# Patient Record
Sex: FEMALE | Race: WHITE | NOT HISPANIC OR LATINO | URBAN - METROPOLITAN AREA
[De-identification: names, ages, dates, MRNs, and addresses within clinical notes are randomized per-mention and may not be internally consistent; named-entity substitution may affect disease eponyms.]

---

## 2021-03-01 ENCOUNTER — TELEPHONE ENCOUNTER (OUTPATIENT)
Dept: URBAN - METROPOLITAN AREA CLINIC 98 | Facility: CLINIC | Age: 26
End: 2021-03-01

## 2021-03-01 ENCOUNTER — OFFICE VISIT (OUTPATIENT)
Dept: URBAN - METROPOLITAN AREA CLINIC 98 | Facility: CLINIC | Age: 26
End: 2021-03-01
Payer: COMMERCIAL

## 2021-03-01 DIAGNOSIS — K58.0 IRRITABLE BOWEL SYNDROME WITH DIARRHEA: ICD-10-CM

## 2021-03-01 DIAGNOSIS — R10.84 ABDOMINAL PAIN, GENERALIZED: ICD-10-CM

## 2021-03-01 PROCEDURE — 99215 OFFICE O/P EST HI 40 MIN: CPT | Performed by: INTERNAL MEDICINE

## 2021-03-01 RX ORDER — DICYCLOMINE HYDROCHLORIDE 10 MG/1
TAKE 1-2 CAPSULES BY ORAL ROUTE 3 TIMES A DAY  PRN CAPSULE ORAL
Qty: 0 | Refills: 0 | Status: ACTIVE | COMMUNITY
Start: 2019-01-07

## 2021-03-01 RX ORDER — HYOSCYAMINE SULFATE 0.12 MG/1
1 TABLET UNDER THE TONGUE AND ALLOW TO DISSOLVE  AS NEEDED TABLET, ORALLY DISINTEGRATING ORAL
Qty: 120 TABLETS | Refills: 11 | OUTPATIENT
Start: 2021-03-01 | End: 2022-02-24

## 2021-03-01 RX ORDER — NORETHINDRONE ACETATE AND ETHINYL ESTRADIOL AND FERROUS FUMARATE 1MG-20(24)
KIT ORAL
Qty: 0 | Refills: 0 | Status: ACTIVE | COMMUNITY
Start: 1900-01-01

## 2021-03-01 RX ORDER — DIPHENOXYLATE HCL/ATROPINE 2.5-.025MG
TAKE 2 TABLETS (5 MG) BY ORAL ROUTE ONCE DAILY AT BEDTIME TABLET ORAL 1
Qty: 30 | Refills: 0 | Status: ACTIVE | COMMUNITY
Start: 2019-04-04

## 2021-03-01 RX ORDER — CHROMIUM 200 MCG
TABLET ORAL
Qty: 0 | Refills: 0 | Status: ACTIVE | COMMUNITY
Start: 1900-01-01

## 2021-03-01 NOTE — HPI-OTHER HISTORIES
26 yo female with FH CD in mother -ileitis at age 40, and now comes for 2 year f/u.  Exttensive w/u over [phone] by Dr. Krishnamurthy.  Syx are the same but the syx are affecting her more.  Life revolves around 'pooping' ---- urgency. Difficult time holding it. Worse in the am. Works in clients homes. Sharp pains from umbilicus to rectum. Not daily but it occurs few times a week. No bleeding. 3-7 stools a day with urgency  has tried fasting, e.g. Friday, fasted the whole time. 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 Best way to have 1-3 BM ---- she is gluten free. That did get rid of eczema. 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  Following a gluten free diet for 6-7years.  She emptied herself and went  Ate mint and might have triggered syx. Does not tolerate Boost or Ensure.  Never tried levsin    ========================= 5/24/19  This is a scheduled follow-up appointment for this patient, a     24 yo female with fh of CD in mother MAGI who is present and got dx with CD at age 40ish, and had CD of SB.  5 level consultation and second opinion  Sarahi has had syx of diarrhea for 5-10 years and has had a careful eval by Dr. Krishnamurthy with no dx made to date.  Left side colon exam and EGD on 9/8/14 was wnl - no colon bx. Pill cam negative per report and was 2015-6.----Review of pill cam report dated 10/28/15 shows delayed gastric and duodenal emptying and 2 questionable smll ulcers vs food in small inteine at 5 hour 3min into study.  Nl cbc cmp, esr and crp in past.  Positive ASCA IgG of 29.1 with nl < 24.9 on 12/13/17.  First BM may be formed and soft and goes 4 more times a day, in range of 2-7 today. No blood. Lower abd pain and urgency. Has gone 4 times a day.  EIM - negative.  GYN noted recurrent yeast.  Dr. Krishnamurthy found SB overgrowth from Feb 2019 and tx with xifaxan and it did not help.  Viberzi did not help - worsened.         Past Medical History  Disease Name Date Onset Notes  Abdominal pain unk 10/23/2015 -   Abdominal pain, generalized 05/24/2019 --   Anxiety unk 10/23/2015 -   Depression unk 10/23/2015 -   Diarrhea --  --   IBS (irritable bowel syndrome) --  --   OTHER --  Gardasil  Ultrasound 2014 10/23/2015 -   Urgency incontinence --  --       Past Surgical History  Procedure Name Date Notes  Colonoscopy 9/08/14   EGD 9/8/14       Medication List  Name Date Started Instructions  dicyclomine 10 mg oral capsule 01/07/2019 take 1-2 capsules by oral route 3 times a day prn  ferrasorb  --   Lomotil 2.5-0.025 mg oral tablet 04/04/2019 take 2 tablets (5 mg) by oral route once daily at bedtime  Minastrin 24 Fe 1 mg-20 mcg(24) /75 mg (4) oral tablet,chewable  --   Prenatal oral  --   theralac  --   true max  --   Vitamin D3 1,000 unit oral capsule  --   zinc 50 mg oral tablet  --       Allergy List  Allergen Name Date Reaction Notes  No Known Environmental Allergies --  --  10/23/2015 -   No Known Food Allergies --  --  10/23/2015 -   Iodine --  --  --       Family Medical History  Disease Name Relative/Age Notes  *No Stated Family Medical History Father/ Mother/  unk      Social History  Finding Status Start/Stop Quantity Notes  Alcohol Former --/-- --  05/24/2019 - 04/04/2019 - 01/16/2019 - 12/12/2018 - 12/13/2017 - 10/23/2015 -   Denies substance abuse Never --/-- --  05/24/2019 -   Tobacco Never --/-- --  12/13/2017 - 10/23/2015 -       Review of Systems  ConstitutionalDenies : body aches, chills, fatigue, fever, loss of appetite, malaise, night sweats, weight gain, weight loss  EyesDenies : blurred vision, visual changes  HENTDenies : Ear Pain/Ringing, hearing loss, Mouth Ulcers/Sores, nose bleeds, problems with gums/teeth, trouble swallowing  CardiovascularDenies : chest pain, leaky heart valves, heart murmur, heart racing/skipping, high blood pressure, palpitations  RespiratoryDenies : chronic cough, shortness of breath, wheezing or asthma symptoms  GastrointestinalDenies : Abdominal Pain/discomfort, Anal/Rectal Pain or Itching, Anal Spasm, Black Stool, Bloating/belching/gaseouness, Change of Bowel Habit, Constipation, Diarrhea/Loose Stool, Difficulty in Swallowing, Heartburn/esophageal reflux, Hemorrhoids, Indigestion, Mucus in Stool, Nausea/vomiting, Rectal Bleeding, Unintentional Weight Loss  GenitourinaryDenies : Blood in Urine, Burning/pain with Urination, frequency, Recent/frequent UTI, Kidney Stones  IntegumentDenies : itching/dry skin, jaundice, rashes, bumps or sores  NeurologicDenies : headaches, dizziness/vertigo, head trauma/injury, recent numbness/weakness, seizures  MusculoskeletalDenies : back pain, decreased range of motion, joint pain/arthritis, Problems Walking/calf or leg pain  EndocrineDenies : bruise easily, excessive thirst, heat/cold intolerance, history of high or low blood sugar  PsychiatricDenies : anxiety, changes in sleep pattern, depression, loss of memory  Heme-LymphDenies : Bleeding Problems, Enlarged Nodes/Swolen Glands, excessive bruising, history of anemia  Allergic-ImmunologicDenies : seasonal allergies    Vitals  Date Time BP Position Site L\R Cuff Size HR RR TEMP (F) WT  HT  BMI kg/m2 BSA m2 O2 Sat      05/24/2019 09:40 /71 Sitting    65 - R  97.8 129lbs 0oz 5'  4" 22.14 1.63        Physical Examination  ConstitutionalAppearance : Well nourished, well developed patient in no distress. Ambulating without difficulty.  Ability to Communicate : Normal communication ability  EyesConjunctivae and Eyelids : Conjunctivae and eyelids appear normal  Sclerae : White without injection  HENTHead :  Inspection : Normocephalic and atraumatic  Face :  Inspection : Face within normal limits  Ears :  External Ears : External ears within normal limits  Nose/Nasopharynx :  External Nose : External nose normal appearance, nares patent, no nasal discharge  Mouth and Throat :  General : Oral cavity appearance normal, breath odor normal  Lips : Appearance normal  NeckInspection and Palpation : Normal appearance without tenderness on palpation or deformities, trachea midline  Thyroid : Normal size without tenderness, nodules or masses  Jugular Veins : no JVD  ChestInspection of Chest : No lesions, deformities or traumatic injuries present. No significant scars are present.  Respiratory Effort : Breathing is unlabored without accessory muscle use  Palpation of Chest : Chest wall non-tender with no masses present. Tactile fremitus is normal  Auscultation : Normal breath sounds  CardiovascularHeart :  Auscultation : Heart rate is regular with normal rhythm. No murmurs are heard. No edema.  GastrointestinalAbdominal Exam : Abdomen soft without rigidity or guarding, abdomen non-tender to palpation, normal bowel sounds, no masses present, non-distended  Liver and Spleen : No hepatomegaly present. Liver is non-tender to palpation and spleen is not palpable.  LymphaticNeck : No lymphadenopathy present  Axillae : No lymphadenopathy present  Groin : No lymphadenopathy present  MusculoskeletalGait and Station : Normal Gait, normal station  Neck/Spine/Pelvis : No tenderness of deformities present.  SkinGeneral Inspection : No rashes, lesions or areas of discoloration present. Skin turgor is normal.  General Palpation : No abnormalities, masses or tenderness on palpation.  Neurologic and PsychiatricOrientation : Oriented to person, place and time  Sensation : Normal sensation  Memory : Short and long term memory intact.  Mood and Affect : Normal mood with an appropriate affect      Assessment  Abdominal pain, generalized     789.07/R10.84  Diarrhea     787.91/R19.7  Urgency incontinence     788.31/N39.41    Plan  OrdersEGD w/Biopsy if indicated (11081) - - 05/24/2019  Colonoscopy with biopsy if indicated (48183) - - 05/24/2019  Patient not identified as an unhealthy alcohol user when screene () - - 05/24/2019  Influenza immunization administered or previously received () - - 05/24/2019  BMI screening above normal () - - 05/24/2019  Current tobacco non-user (CAD, cap, COPD, PV) (dm) (IBD) (1036F, ) - - 05/24/2019  Colorectal cancer screening results documented and reviewed (PV) (3017F) - - 05/24/2019  Documentation of current medications. () - - 05/24/2019  CMP (comprehensive metabolic panel) (01961) - - 05/24/2019  Sed rate (45886) - - 05/24/2019  C-reactive protein (50354) - - 05/24/2019  Ferritin assay (50604) - - 05/24/2019  Iron + iron binding capacity panel ser/plas (93865, 26909) - - 05/24/2019  Vitamin B12 and folate measurement (55692, 01514) - - 05/24/2019  CBC with diff (69014) - - 05/24/2019  25-OH-Vitamin D (53344) - - 05/24/2019  Amylase (96780) - - 05/24/2019  Lipase (71263) - - 05/24/2019  Lactoferrin stl QN (02297) - - 05/24/2019  Calprotectin stool (92135) - - 05/24/2019  Stool - O and P (11389, 32267, 48589) - - 05/24/2019  Stool culture (62162, 66575) - - 05/24/2019  Fecal leukocyte assessment, qualitative or semi-quantitative (28110, 75604) - - 05/24/2019  Occult blood stool QL (85734) - - 05/24/2019  Clostridium difficile toxin nucleic acid amplified probe (99500) - - 05/24/2019  QuantiFERON-TB Gold test (70281) - - 05/24/2019  Varicella-zoster antibody IgG assay (41855) - - 05/24/2019  Hep A AB IGM (99717) - - 05/24/2019  Hep A Total (95873) - - 05/24/2019  Hep B Surface AG (91465) - - 05/24/2019  Hep B Surface AB - Qualitative (40315) - - 05/24/2019  Hepatitis C virus (HCV) IgG antibody assay (85015) - - 05/24/2019  InstructionsI have documented a list of current medications and reviewed it with the patient.  I encouraged the patient to diet and exercise.  DispositionReturn To Clinic in 1 Month    5 level consultation and second opinion.  see orders.  double c diff         Electronically Signed by: Royer Golden MD -Author on May 24, 2019 09:54:10 AM

## 2021-03-05 LAB
A/G RATIO: 1.6
ACCA: 3
ALBUMIN: 4.6
ALCA: 33
ALKALINE PHOSPHATASE: 63
ALT (SGPT): 11
AMCA: 156
AMYLASE: 65
AST (SGOT): 18
ATYPICAL PANCA: NEGATIVE
BASO (ABSOLUTE): 0
BASOS: 1
BILIRUBIN, TOTAL: 0.3
BUN/CREATININE RATIO: 14
BUN: 13
C-REACTIVE PROTEIN, QUANT: 1
CALCIUM: 9.5
CARBON DIOXIDE, TOTAL: 24
CHLORIDE: 105
COMMENTS: (no result)
CREATININE: 0.93
DEAMIDATED GLIADIN ABS, IGA: 5
DEAMIDATED GLIADIN ABS, IGG: 6
EGFR IF AFRICN AM: 99
EGFR IF NONAFRICN AM: 86
ENDOMYSIAL ANTIBODY IGA: NEGATIVE
EOS (ABSOLUTE): 0.1
EOS: 2
FERRITIN, SERUM: 13
FOLATE (FOLIC ACID), SERUM: 15.2
GASCA: 52
GLOBULIN, TOTAL: 2.8
GLUCOSE: 89
HEMATOCRIT: 36.9
HEMATOLOGY COMMENTS:: (no result)
HEMOGLOBIN: 12.1
IMMATURE CELLS: (no result)
IMMATURE GRANS (ABS): 0
IMMATURE GRANULOCYTES: 0
IMMUNOGLOBULIN A, QN, SERUM: 32
IRON BIND.CAP.(TIBC): 520
IRON SATURATION: 18
IRON: 93
LIPASE: 44
LYMPHS (ABSOLUTE): 2.6
LYMPHS: 42
MCH: 28.3
MCHC: 32.8
MCV: 86
MONOCYTES(ABSOLUTE): 0.4
MONOCYTES: 6
NEUTROPHILS (ABSOLUTE): 3.2
NEUTROPHILS: 49
NRBC: (no result)
PLATELETS: 211
POTASSIUM: 4.4
PROTEIN, TOTAL: 7.4
RBC: 4.27
RDW: 12.5
SEDIMENTATION RATE-WESTERGREN: 2
SODIUM: 139
T-TRANSGLUTAMINASE (TTG) IGA: <2
T-TRANSGLUTAMINASE (TTG) IGG: 4
UIBC: 427
VITAMIN B12: 366
VITAMIN D, 25-HYDROXY: 49.9
WBC: 6.3

## 2021-03-10 LAB
C DIFFICILE TOXIN GENE NAA: NEGATIVE
C DIFFICILE TOXINS A+B, EIA: NEGATIVE
CALPROTECTIN, FECAL: 33
CAMPYLOBACTER CULTURE: (no result)
E COLI SHIGA TOXIN EIA: NEGATIVE
LACTOFERRIN, FECAL, QUANT.: <1
Lab: (no result)
OVA + PARASITE EXAM: (no result)
SALMONELLA/SHIGELLA SCREEN: (no result)

## 2021-04-13 ENCOUNTER — APPOINTMENT (RX ONLY)
Dept: URBAN - METROPOLITAN AREA OTHER 4 | Facility: OTHER | Age: 26
Setting detail: DERMATOLOGY
End: 2021-04-13

## 2021-04-13 ENCOUNTER — OFFICE VISIT (OUTPATIENT)
Dept: URBAN - METROPOLITAN AREA SURGERY CENTER 18 | Facility: SURGERY CENTER | Age: 26
End: 2021-04-13

## 2021-04-13 DIAGNOSIS — L23.9 ALLERGIC CONTACT DERMATITIS, UNSPECIFIED CAUSE: ICD-10-CM

## 2021-04-13 DIAGNOSIS — L70.0 ACNE VULGARIS: ICD-10-CM

## 2021-04-13 PROBLEM — 71833008 CROHN'S DISEASE OF SMALL AND LARGE INTESTINES: Status: ACTIVE | Noted: 2021-04-13

## 2021-04-13 PROCEDURE — ? PRESCRIPTION

## 2021-04-13 PROCEDURE — ? COUNSELING

## 2021-04-13 PROCEDURE — ? TREATMENT REGIMEN

## 2021-04-13 PROCEDURE — 99203 OFFICE O/P NEW LOW 30 MIN: CPT

## 2021-04-13 RX ORDER — HYDROCORTISONE 25 MG/G
CREAM TOPICAL
Qty: 1 | Refills: 0 | Status: ERX | COMMUNITY
Start: 2021-04-13

## 2021-04-13 RX ORDER — ADAPALENE AND BENZOYL PEROXIDE 3; 25 MG/G; MG/G
GEL TOPICAL
Qty: 1 | Refills: 0 | Status: ERX | COMMUNITY
Start: 2021-04-13

## 2021-04-13 RX ORDER — PREDNISONE 10 MG/1
TABLET ORAL AS DIRECTED
Qty: 18 | Refills: 0 | Status: ERX | COMMUNITY
Start: 2021-04-13

## 2021-04-13 RX ADMIN — ADAPALENE AND BENZOYL PEROXIDE: 3; 25 GEL TOPICAL at 00:00

## 2021-04-13 RX ADMIN — PREDNISONE: 10 TABLET ORAL at 00:00

## 2021-04-13 RX ADMIN — HYDROCORTISONE: 25 CREAM TOPICAL at 00:00

## 2021-04-13 ASSESSMENT — LOCATION ZONE DERM
LOCATION ZONE: EYELID
LOCATION ZONE: FACE

## 2021-04-13 ASSESSMENT — LOCATION SIMPLE DESCRIPTION DERM
LOCATION SIMPLE: LEFT SUPERIOR EYELID
LOCATION SIMPLE: RIGHT SUPERIOR EYELID
LOCATION SIMPLE: RIGHT INFERIOR EYELID
LOCATION SIMPLE: LEFT CHEEK
LOCATION SIMPLE: INFERIOR FOREHEAD

## 2021-04-13 ASSESSMENT — LOCATION DETAILED DESCRIPTION DERM
LOCATION DETAILED: INFERIOR MID FOREHEAD
LOCATION DETAILED: RIGHT MEDIAL INFERIOR EYELID
LOCATION DETAILED: RIGHT MEDIAL SUPERIOR EYELID
LOCATION DETAILED: LEFT SUPERIOR CENTRAL MALAR CHEEK
LOCATION DETAILED: LEFT LATERAL SUPERIOR EYELID

## 2021-04-13 ASSESSMENT — SEVERITY ASSESSMENT 2020: SEVERITY 2020: MODERATE

## 2021-04-13 ASSESSMENT — SEVERITY ASSESSMENT OVERALL AMONG ALL PATIENTS
IN YOUR EXPERIENCE, AMONG ALL PATIENTS YOU HAVE SEEN WITH THIS CONDITION, HOW SEVERE IS THIS PATIENT'S CONDITION?: FEW INFLAMMATORY LESIONS, SOME NONINFLAMMATORY

## 2021-04-13 ASSESSMENT — PAIN INTENSITY VAS: HOW INTENSE IS YOUR PAIN 0 BEING NO PAIN, 10 BEING THE MOST SEVERE PAIN POSSIBLE?: NO PAIN

## 2021-04-13 NOTE — PROCEDURE: COUNSELING
Detail Level: Simple
Topical Clindamycin Pregnancy And Lactation Text: This medication is Pregnancy Category B and is considered safe during pregnancy. It is unknown if it is excreted in breast milk.
Isotretinoin Pregnancy And Lactation Text: This medication is Pregnancy Category X and is considered extremely dangerous during pregnancy. It is unknown if it is excreted in breast milk.
Tetracycline Counseling: Patient counseled regarding possible photosensitivity and increased risk for sunburn.  Patient instructed to avoid sunlight, if possible.  When exposed to sunlight, patients should wear protective clothing, sunglasses, and sunscreen.  The patient was instructed to call the office immediately if the following severe adverse effects occur:  hearing changes, easy bruising/bleeding, severe headache, or vision changes.  The patient verbalized understanding of the proper use and possible adverse effects of tetracycline.  All of the patient's questions and concerns were addressed. Patient understands to avoid pregnancy while on therapy due to potential birth defects.
Birth Control Pills Counseling: Birth Control Pill Counseling: I discussed with the patient the potential side effects of OCPs including but not limited to increased risk of stroke, heart attack, thrombophlebitis, deep venous thrombosis, hepatic adenomas, breast changes, GI upset, headaches, and depression.  The patient verbalized understanding of the proper use and possible adverse effects of OCPs. All of the patient's questions and concerns were addressed.
Include Pregnancy/Lactation Warning?: No
Doxycycline Counseling:  Patient counseled regarding possible photosensitivity and increased risk for sunburn.  Patient instructed to avoid sunlight, if possible.  When exposed to sunlight, patients should wear protective clothing, sunglasses, and sunscreen.  The patient was instructed to call the office immediately if the following severe adverse effects occur:  hearing changes, easy bruising/bleeding, severe headache, or vision changes.  The patient verbalized understanding of the proper use and possible adverse effects of doxycycline.  All of the patient's questions and concerns were addressed.
Topical Retinoid counseling:  Patient advised to apply a pea-sized amount only at bedtime and wait 30 minutes after washing their face before applying.  If too drying, patient may add a non-comedogenic moisturizer. The patient verbalized understanding of the proper use and possible adverse effects of retinoids.  All of the patient's questions and concerns were addressed.
Minocycline Pregnancy And Lactation Text: This medication is Pregnancy Category D and not consider safe during pregnancy. It is also excreted in breast milk.
Bactrim Pregnancy And Lactation Text: This medication is Pregnancy Category D and is known to cause fetal risk.  It is also excreted in breast milk.
Detail Level: Zone
Topical Clindamycin Counseling: Patient counseled that this medication may cause skin irritation or allergic reactions.  In the event of skin irritation, the patient was advised to reduce the amount of the drug applied or use it less frequently.   The patient verbalized understanding of the proper use and possible adverse effects of clindamycin.  All of the patient's questions and concerns were addressed.
Spironolactone Pregnancy And Lactation Text: This medication can cause feminization of the male fetus and should be avoided during pregnancy. The active metabolite is also found in breast milk.
High Dose Vitamin A Counseling: Side effects reviewed, pt to contact office should one occur.
Birth Control Pills Pregnancy And Lactation Text: This medication should be avoided if pregnant and for the first 30 days post-partum.
Topical Sulfur Applications Counseling: Topical Sulfur Counseling: Patient counseled that this medication may cause skin irritation or allergic reactions.  In the event of skin irritation, the patient was advised to reduce the amount of the drug applied or use it less frequently.   The patient verbalized understanding of the proper use and possible adverse effects of topical sulfur application.  All of the patient's questions and concerns were addressed.
Sarecycline Counseling: Patient advised regarding possible photosensitivity and discoloration of the teeth, skin, lips, tongue and gums.  Patient instructed to avoid sunlight, if possible.  When exposed to sunlight, patients should wear protective clothing, sunglasses, and sunscreen.  The patient was instructed to call the office immediately if the following severe adverse effects occur:  hearing changes, easy bruising/bleeding, severe headache, or vision changes.  The patient verbalized understanding of the proper use and possible adverse effects of sarecycline.  All of the patient's questions and concerns were addressed.
Doxycycline Pregnancy And Lactation Text: This medication is Pregnancy Category D and not consider safe during pregnancy. It is also excreted in breast milk but is considered safe for shorter treatment courses.
Azithromycin Counseling:  I discussed with the patient the risks of azithromycin including but not limited to GI upset, allergic reaction, drug rash, diarrhea, and yeast infections.
Topical Retinoid Pregnancy And Lactation Text: This medication is Pregnancy Category C. It is unknown if this medication is excreted in breast milk.
High Dose Vitamin A Pregnancy And Lactation Text: High dose vitamin A therapy is contraindicated during pregnancy and breast feeding.
Spironolactone Counseling: Patient advised regarding risks of diarrhea, abdominal pain, hyperkalemia, birth defects (for female patients), liver toxicity and renal toxicity. The patient may need blood work to monitor liver and kidney function and potassium levels while on therapy. The patient verbalized understanding of the proper use and possible adverse effects of spironolactone.  All of the patient's questions and concerns were addressed.
Dapsone Counseling: I discussed with the patient the risks of dapsone including but not limited to hemolytic anemia, agranulocytosis, rashes, methemoglobinemia, kidney failure, peripheral neuropathy, headaches, GI upset, and liver toxicity.  Patients who start dapsone require monitoring including baseline LFTs and weekly CBCs for the first month, then every month thereafter.  The patient verbalized understanding of the proper use and possible adverse effects of dapsone.  All of the patient's questions and concerns were addressed.
Benzoyl Peroxide Counseling: Patient counseled that medicine may cause skin irritation and bleach clothing.  In the event of skin irritation, the patient was advised to reduce the amount of the drug applied or use it less frequently.   The patient verbalized understanding of the proper use and possible adverse effects of benzoyl peroxide.  All of the patient's questions and concerns were addressed.
Tazorac Counseling:  Patient advised that medication is irritating and drying.  Patient may need to apply sparingly and wash off after an hour before eventually leaving it on overnight.  The patient verbalized understanding of the proper use and possible adverse effects of tazorac.  All of the patient's questions and concerns were addressed.
Erythromycin Counseling:  I discussed with the patient the risks of erythromycin including but not limited to GI upset, allergic reaction, drug rash, diarrhea, increase in liver enzymes, and yeast infections.
Topical Sulfur Applications Pregnancy And Lactation Text: This medication is Pregnancy Category C and has an unknown safety profile during pregnancy. It is unknown if this topical medication is excreted in breast milk.
Azithromycin Pregnancy And Lactation Text: This medication is considered safe during pregnancy and is also secreted in breast milk.
Dapsone Pregnancy And Lactation Text: This medication is Pregnancy Category C and is not considered safe during pregnancy or breast feeding.
Isotretinoin Counseling: Patient should get monthly blood tests, not donate blood, not drive at night if vision affected, not share medication, and not undergo elective surgery for 6 months after tx completed. Side effects reviewed, pt to contact office should one occur.
Minocycline Counseling: Patient advised regarding possible photosensitivity and discoloration of the teeth, skin, lips, tongue and gums.  Patient instructed to avoid sunlight, if possible.  When exposed to sunlight, patients should wear protective clothing, sunglasses, and sunscreen.  The patient was instructed to call the office immediately if the following severe adverse effects occur:  hearing changes, easy bruising/bleeding, severe headache, or vision changes.  The patient verbalized understanding of the proper use and possible adverse effects of minocycline.  All of the patient's questions and concerns were addressed.
Bactrim Counseling:  I discussed with the patient the risks of sulfa antibiotics including but not limited to GI upset, allergic reaction, drug rash, diarrhea, dizziness, photosensitivity, and yeast infections.  Rarely, more serious reactions can occur including but not limited to aplastic anemia, agranulocytosis, methemoglobinemia, blood dyscrasias, liver or kidney failure, lung infiltrates or desquamative/blistering drug rashes.
Benzoyl Peroxide Pregnancy And Lactation Text: This medication is Pregnancy Category C. It is unknown if benzoyl peroxide is excreted in breast milk.
Erythromycin Pregnancy And Lactation Text: This medication is Pregnancy Category B and is considered safe during pregnancy. It is also excreted in breast milk.
Tazorac Pregnancy And Lactation Text: This medication is not safe during pregnancy. It is unknown if this medication is excreted in breast milk.

## 2021-04-20 ENCOUNTER — OFFICE VISIT (OUTPATIENT)
Dept: URBAN - METROPOLITAN AREA SURGERY CENTER 18 | Facility: SURGERY CENTER | Age: 26
End: 2021-04-20
Payer: COMMERCIAL

## 2021-04-20 ENCOUNTER — CLAIMS CREATED FROM THE CLAIM WINDOW (OUTPATIENT)
Dept: URBAN - METROPOLITAN AREA CLINIC 4 | Facility: CLINIC | Age: 26
End: 2021-04-20
Payer: COMMERCIAL

## 2021-04-20 DIAGNOSIS — K22.8 OTHER SPECIFIED DISEASES OF ESOPHAGUS: ICD-10-CM

## 2021-04-20 DIAGNOSIS — K63.89 BACTERIAL OVERGROWTH SYNDROME: ICD-10-CM

## 2021-04-20 DIAGNOSIS — R19.7 ACUTE DIARRHEA: ICD-10-CM

## 2021-04-20 DIAGNOSIS — K31.89 MASS OF DUODENUM: ICD-10-CM

## 2021-04-20 DIAGNOSIS — K63.89 STENOSIS OF ILEOCECAL VALVE: ICD-10-CM

## 2021-04-20 PROCEDURE — 88312 SPECIAL STAINS GROUP 1: CPT | Performed by: PATHOLOGY

## 2021-04-20 PROCEDURE — 45380 COLONOSCOPY AND BIOPSY: CPT | Performed by: INTERNAL MEDICINE

## 2021-04-20 PROCEDURE — 88305 TISSUE EXAM BY PATHOLOGIST: CPT | Performed by: PATHOLOGY

## 2021-04-20 PROCEDURE — 43239 EGD BIOPSY SINGLE/MULTIPLE: CPT | Performed by: INTERNAL MEDICINE

## 2021-04-20 PROCEDURE — G8907 PT DOC NO EVENTS ON DISCHARG: HCPCS | Performed by: INTERNAL MEDICINE

## 2021-04-20 RX ORDER — HYOSCYAMINE SULFATE 0.12 MG/1
1 TABLET UNDER THE TONGUE AND ALLOW TO DISSOLVE  AS NEEDED TABLET, ORALLY DISINTEGRATING ORAL
Qty: 120 TABLETS | Refills: 11 | Status: ACTIVE | COMMUNITY
Start: 2021-03-01 | End: 2022-02-24

## 2021-04-20 RX ORDER — NORETHINDRONE ACETATE AND ETHINYL ESTRADIOL AND FERROUS FUMARATE 1MG-20(24)
KIT ORAL
Qty: 0 | Refills: 0 | Status: ACTIVE | COMMUNITY
Start: 1900-01-01

## 2021-04-20 RX ORDER — DIPHENOXYLATE HCL/ATROPINE 2.5-.025MG
TAKE 2 TABLETS (5 MG) BY ORAL ROUTE ONCE DAILY AT BEDTIME TABLET ORAL 1
Qty: 30 | Refills: 0 | Status: ACTIVE | COMMUNITY
Start: 2019-04-04

## 2021-04-20 RX ORDER — CHROMIUM 200 MCG
TABLET ORAL
Qty: 0 | Refills: 0 | Status: ACTIVE | COMMUNITY
Start: 1900-01-01

## 2021-04-20 RX ORDER — DICYCLOMINE HYDROCHLORIDE 10 MG/1
TAKE 1-2 CAPSULES BY ORAL ROUTE 3 TIMES A DAY  PRN CAPSULE ORAL
Qty: 0 | Refills: 0 | Status: ACTIVE | COMMUNITY
Start: 2019-01-07

## 2021-05-13 ENCOUNTER — DASHBOARD ENCOUNTERS (OUTPATIENT)
Age: 26
End: 2021-05-13

## 2021-05-18 ENCOUNTER — TELEPHONE ENCOUNTER (OUTPATIENT)
Dept: URBAN - METROPOLITAN AREA CLINIC 98 | Facility: CLINIC | Age: 26
End: 2021-05-18

## 2021-05-18 ENCOUNTER — OFFICE VISIT (OUTPATIENT)
Dept: URBAN - METROPOLITAN AREA CLINIC 98 | Facility: CLINIC | Age: 26
End: 2021-05-18
Payer: COMMERCIAL

## 2021-05-18 DIAGNOSIS — R19.7 DIARRHEA: ICD-10-CM

## 2021-05-18 DIAGNOSIS — K58.9 IBS (IRRITABLE BOWEL SYNDROME): ICD-10-CM

## 2021-05-18 PROCEDURE — 99214 OFFICE O/P EST MOD 30 MIN: CPT | Performed by: INTERNAL MEDICINE

## 2021-05-18 RX ORDER — RIFAXIMIN 550 MG/1
1 TABLET TABLET ORAL THREE TIMES A DAY
Qty: 42 TABLET | Refills: 1 | OUTPATIENT
Start: 2021-05-18 | End: 2021-06-15

## 2021-05-18 RX ORDER — HYOSCYAMINE SULFATE 0.12 MG/1
1 TABLET UNDER THE TONGUE AND ALLOW TO DISSOLVE  AS NEEDED TABLET, ORALLY DISINTEGRATING ORAL
Qty: 120 TABLETS | Refills: 11 | Status: ACTIVE | COMMUNITY
Start: 2021-03-01 | End: 2022-02-24

## 2021-05-18 RX ORDER — DIPHENOXYLATE HCL/ATROPINE 2.5-.025MG
TAKE 2 TABLETS (5 MG) BY ORAL ROUTE ONCE DAILY AT BEDTIME TABLET ORAL 1
Qty: 30 | Refills: 0 | Status: ACTIVE | COMMUNITY
Start: 2019-04-04

## 2021-05-18 RX ORDER — CHROMIUM 200 MCG
TABLET ORAL
Qty: 0 | Refills: 0 | Status: ACTIVE | COMMUNITY
Start: 1900-01-01

## 2021-05-18 RX ORDER — DICYCLOMINE HYDROCHLORIDE 10 MG/1
TAKE 1-2 CAPSULES BY ORAL ROUTE 3 TIMES A DAY  PRN CAPSULE ORAL
Qty: 0 | Refills: 0 | Status: ACTIVE | COMMUNITY
Start: 2019-01-07

## 2021-05-18 RX ORDER — NORETHINDRONE ACETATE AND ETHINYL ESTRADIOL AND FERROUS FUMARATE 1MG-20(24)
KIT ORAL
Qty: 0 | Refills: 0 | Status: ACTIVE | COMMUNITY
Start: 1900-01-01

## 2021-05-18 NOTE — HPI-TODAY'S VISIT:
24 yo female with FH CD in mother comes in for second visit after labs and endoscopy.  Labs all nl ex ASCA 52/50 and AMCA sl incr 80/30 and nl celiac but IgA 32.  8 labs lala and lip o/w nl.  Has stooling too frequent and crampy pain.  There are times she "can't control her anus".  EGD and Colonoscopy 4/20/21 were nl and all bx comletely nl. nl MRE  3/8/19 Had nl pill cam with Dr. Krishnamurthy2014.  Was on levsin in past and did not like the effects.  Diarrhea most days and it is the frequency and the discomfort.  She awakens in am and goes to the br.  She does not do well with any food.  Does not have celiac but avoiding gluten got rid of the excema.  Never been on xifaxan. No metamucil. Past bentyl  Will try xifaxan for 2-4 weeks. Hands and feet fall asleep. Pees alot. Fatigue and muscle weakness. Works as a  Takes a multi ==================== 3/1/21  24 yo female with FH CD in mother -ileitis at age 40, and now comes for 2 year f/u.  Exttensive w/u over [id number] by Dr. Krishnamurthy.  Syx are the same but the syx are affecting her more.  Life revolves around 'pooping' ---- urgency. Difficult time holding it. Worse in the am. Works in [employer]. Sharp pains from umbilicus to rectum. Not daily but it occurs few times a week. No bleeding. 3-7 stools a day with urgency  has tried fasting, e.g. Friday, fasted the whole time. 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 Best way to have 1-3 BM ---- she is gluten free. That did get rid of eczema. 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  Following a gluten free diet for 6-7years.  She emptied herself and went  Ate mint and might have triggered syx. Does not tolerate Boost or Ensure.  Never tried levsin    ========================= 5/24/19  This is a scheduled follow-up appointment for this patient, a     24 yo female with fh of CD in mother MAGI who is present and got dx with CD at age 40ish, and had CD of SB.  5 level consultation and second opinion  Sarahi has had syx of diarrhea for 5-10 years and has had a careful eval by Dr. Krishnamurthy with no dx made to date.  Left side colon exam and EGD on 9/8/14 was wnl - no colon bx. Pill cam negative per report and was 2015-6.----Review of pill cam report dated 10/28/15 shows delayed gastric and duodenal emptying and 2 questionable smll ulcers vs food in small inteine at 5 hour 3min into study.  Nl cbc cmp, esr and crp in past.  Positive ASCA IgG of 29.1 with nl < 24.9 on 12/13/17.  First BM may be formed and soft and goes 4 more times a day, in range of 2-7 today. No blood. Lower abd pain and urgency. Has gone 4 times a day.  EIM - negative.  GYN noted recurrent yeast.  Dr. Krishnamurthy found SB overgrowth from Feb 2019 and tx with xifaxan and it did not help.  Viberzi did not help - worsened.         Past Medical History  Disease Name Date Onset Notes  Abdominal pain unk 10/23/2015 -   Abdominal pain, generalized 05/24/2019 --   Anxiety unk 10/23/2015 -   Depression unk 10/23/2015 -   Diarrhea --  --   IBS (irritable bowel syndrome) --  --   OTHER --  Gardasil  Ultrasound 2014 10/23/2015 -   Urgency incontinence --  --       Past Surgical History  Procedure Name Date Notes  Colonoscopy 9/08/14   EGD 9/8/14       Medication List  Name Date Started Instructions  dicyclomine 10 mg oral capsule 01/07/2019 take 1-2 capsules by oral route 3 times a day prn  ferrasorb  --   Lomotil 2.5-0.025 mg oral tablet 04/04/2019 take 2 tablets (5 mg) by oral route once daily at bedtime  Minastrin 24 Fe 1 mg-20 mcg(24) /75 mg (4) oral tablet,chewable  --   Prenatal oral  --   theralac  --   true max  --   Vitamin D3 1,000 unit oral capsule  --   zinc 50 mg oral tablet  --       Allergy List  Allergen Name Date Reaction Notes  No Known Environmental Allergies --  --  10/23/2015 -   No Known Food Allergies --  --  10/23/2015 -   Iodine --  --  --       Family Medical History  Disease Name Relative/Age Notes  *No Stated Family Medical History Father/ Mother/  unk      Social History  Finding Status Start/Stop Quantity Notes  Alcohol Former --/-- --  05/24/2019 - 04/04/2019 - 01/16/2019 - 12/12/2018 - 12/13/2017 - 10/23/2015 -   Denies substance abuse Never --/-- --  05/24/2019 -   Tobacco Never --/-- --  12/13/2017 - 10/23/2015 -       Review of Systems  ConstitutionalDenies : body aches, chills, fatigue, fever, loss of appetite, malaise, night sweats, weight gain, weight loss  EyesDenies : blurred vision, visual changes  HENTDenies : Ear Pain/Ringing, hearing loss, Mouth Ulcers/Sores, nose bleeds, problems with gums/teeth, trouble swallowing  CardiovascularDenies : chest pain, leaky heart valves, heart murmur, heart racing/skipping, high blood pressure, palpitations  RespiratoryDenies : chronic cough, shortness of breath, wheezing or asthma symptoms  GastrointestinalDenies : Abdominal Pain/discomfort, Anal/Rectal Pain or Itching, Anal Spasm, Black Stool, Bloating/belching/gaseouness, Change of Bowel Habit, Constipation, Diarrhea/Loose Stool, Difficulty in Swallowing, Heartburn/esophageal reflux, Hemorrhoids, Indigestion, Mucus in Stool, Nausea/vomiting, Rectal Bleeding, Unintentional Weight Loss  GenitourinaryDenies : Blood in Urine, Burning/pain with Urination, frequency, Recent/frequent UTI, Kidney Stones  IntegumentDenies : itching/dry skin, jaundice, rashes, bumps or sores  NeurologicDenies : headaches, dizziness/vertigo, head trauma/injury, recent numbness/weakness, seizures  MusculoskeletalDenies : back pain, decreased range of motion, joint pain/arthritis, Problems Walking/calf or leg pain  EndocrineDenies : bruise easily, excessive thirst, heat/cold intolerance, history of high or low blood sugar  PsychiatricDenies : anxiety, changes in sleep pattern, depression, loss of memory  Heme-LymphDenies : Bleeding Problems, Enlarged Nodes/Swolen Glands, excessive bruising, history of anemia  Allergic-ImmunologicDenies : seasonal allergies    Vitals  Date Time BP Position Site L\R Cuff Size HR RR TEMP (F) WT  HT  BMI kg/m2 BSA m2 O2 Sat      05/24/2019 09:40 /71 Sitting    65 - R  97.8 129lbs 0oz 5'  4" 22.14 1.63        Physical Examination  ConstitutionalAppearance : Well nourished, well developed patient in no distress. Ambulating without difficulty.  Ability to Communicate : Normal communication ability  EyesConjunctivae and Eyelids : Conjunctivae and eyelids appear normal  Sclerae : White without injection  HENTHead :  Inspection : Normocephalic and atraumatic  Face :  Inspection : Face within normal limits  Ears :  External Ears : External ears within normal limits  Nose/Nasopharynx :  External Nose : External nose normal appearance, nares patent, no nasal discharge  Mouth and Throat :  General : Oral cavity appearance normal, breath odor normal  Lips : Appearance normal  NeckInspection and Palpation : Normal appearance without tenderness on palpation or deformities, trachea midline  Thyroid : Normal size without tenderness, nodules or masses  Jugular Veins : no JVD  ChestInspection of Chest : No lesions, deformities or traumatic injuries present. No significant scars are present.  Respiratory Effort : Breathing is unlabored without accessory muscle use  Palpation of Chest : Chest wall non-tender with no masses present. Tactile fremitus is normal  Auscultation : Normal breath sounds  CardiovascularHeart :  Auscultation : Heart rate is regular with normal rhythm. No murmurs are heard. No edema.  GastrointestinalAbdominal Exam : Abdomen soft without rigidity or guarding, abdomen non-tender to palpation, normal bowel sounds, no masses present, non-distended  Liver and Spleen : No hepatomegaly present. Liver is non-tender to palpation and spleen is not palpable.  LymphaticNeck : No lymphadenopathy present  Axillae : No lymphadenopathy present  Groin : No lymphadenopathy present  MusculoskeletalGait and Station : Normal Gait, normal station  Neck/Spine/Pelvis : No tenderness of deformities present.  SkinGeneral Inspection : No rashes, lesions or areas of discoloration present. Skin turgor is normal.  General Palpation : No abnormalities, masses or tenderness on palpation.  Neurologic and PsychiatricOrientation : Oriented to person, place and time  Sensation : Normal sensation  Memory : Short and long term memory intact.  Mood and Affect : Normal mood with an appropriate affect      Assessment  Abdominal pain, generalized     789.07/R10.84  Diarrhea     787.91/R19.7  Urgency incontinence     788.31/N39.41    Plan  OrdersEGD w/Biopsy if indicated (34657) - - 05/24/2019  Colonoscopy with biopsy if indicated (99932) - - 05/24/2019  Patient not identified as an unhealthy alcohol user when screene () - - 05/24/2019  Influenza immunization administered or previously received () - - 05/24/2019  BMI screening above normal () - - 05/24/2019  Current tobacco non-user (CAD, cap, COPD, PV) (dm) (IBD) (1036F, ) - - 05/24/2019  Colorectal cancer screening results documented and reviewed (PV) (3017F) - - 05/24/2019  Documentation of current medications. () - - 05/24/2019  CMP (comprehensive metabolic panel) (69105) - - 05/24/2019  Sed rate (81220) - - 05/24/2019  C-reactive protein (78187) - - 05/24/2019  Ferritin assay (93643) - - 05/24/2019  Iron + iron binding capacity panel ser/plas (64045, 10694) - - 05/24/2019  Vitamin B12 and folate measurement (41186, 14366) - - 05/24/2019  CBC with diff (74682) - - 05/24/2019  25-OH-Vitamin D (88293) - - 05/24/2019  Amylase (77673) - - 05/24/2019  Lipase (47644) - - 05/24/2019  Lactoferrin stl QN (38194) - - 05/24/2019  Calprotectin stool (70864) - - 05/24/2019  Stool - O and P (18362, 19518, 29447) - - 05/24/2019  Stool culture (84482, 16825) - - 05/24/2019  Fecal leukocyte assessment, qualitative or semi-quantitative (35201, 26129) - - 05/24/2019  Occult blood stool QL (33295) - - 05/24/2019  Clostridium difficile toxin nucleic acid amplified probe (32839) - - 05/24/2019  QuantiFERON-TB Gold test (18999) - - 05/24/2019  Varicella-zoster antibody IgG assay (38121) - - 05/24/2019  Hep A AB IGM (38930) - - 05/24/2019  Hep A Total (00953) - - 05/24/2019  Hep B Surface AG (02375) - - 05/24/2019  Hep B Surface AB - Qualitative (62088) - - 05/24/2019  Hepatitis C virus (HCV) IgG antibody assay (05254) - - 05/24/2019  InstructionsI have documented a list of current medications and reviewed it with the patient.  I encouraged the patient to diet and exercise.  DispositionReturn To Clinic in 1 Month    5 level consultation and second opinion.  see orders.  double c diff         Electronically Signed by: Royer Golden MD -Author on May 24, 2019 09:54:10 AM

## 2021-06-07 ENCOUNTER — TELEPHONE ENCOUNTER (OUTPATIENT)
Dept: URBAN - METROPOLITAN AREA CLINIC 98 | Facility: CLINIC | Age: 26
End: 2021-06-07

## 2021-06-09 ENCOUNTER — TELEPHONE ENCOUNTER (OUTPATIENT)
Dept: URBAN - METROPOLITAN AREA CLINIC 98 | Facility: CLINIC | Age: 26
End: 2021-06-09

## 2021-06-09 RX ORDER — DICYCLOMINE HYDROCHLORIDE 20 MG/1
1 TABLET TABLET ORAL THREE TIMES A DAY
Qty: 90 TABLETS | Refills: 5 | OUTPATIENT
Start: 2021-06-09 | End: 2021-12-05

## 2021-08-17 ENCOUNTER — TELEPHONE ENCOUNTER (OUTPATIENT)
Dept: URBAN - METROPOLITAN AREA CLINIC 98 | Facility: CLINIC | Age: 26
End: 2021-08-17

## 2022-04-06 NOTE — PROCEDURE: TREATMENT REGIMEN
Plan: Patient is having a colonoscopy in a week, she will wait to take the Prednisone tablets after the procedure.
Detail Level: Simple
Stelara Counseling:  I discussed with the patient the risks of ustekinumab including but not limited to immunosuppression, malignancy, posterior leukoencephalopathy syndrome, and serious infections.  The patient understands that monitoring is required including a PPD at baseline and must alert us or the primary physician if symptoms of infection or other concerning signs are noted.